# Patient Record
(demographics unavailable — no encounter records)

---

## 2024-10-11 NOTE — PHYSICAL EXAM
[TextEntry] : Focused Head and Neck Examination:  General Appearance: The patient has a normal appearance (head and face), able to communicate with normal speech and is a well-groomed, well-developed, well-nourished, thin female in no apparent distress.  Breathing was silent and not labored. The patient was alert had normal affect and oriented to person, place, and time. The patient had normal facial and neck skin with normal facial symmetry, strength and motion, no visible/ palpable facial masses or sinus tenderness.  Otologic Exam: The pinnae and bilateral external ear canals were without lesions and clear after removal cerumen AD.  The tympanic membranes were normal without perforation and demonstrated normal mobility. There was no evidence of middle ear effusion or infection. Hearing is grossly normal to finger rub.   External Nasal Exam: The external nose was normal in appearance without lesions or deformity.    Intranasal Exam: There were no mucosal lesions, discolorations, nasal polyps, or masses. The nasal septum was intact without perforations. The nasal septum was deviated slightly to the right. The inferior turbinates were normal. The middle turbinates were normal bilaterally.  The middle meatus was clear, and the secretions were normal.  Nasopharynx: There were no visible masses, mucosal ulcerations, or other lesions.  Secretions were normal.   Oral cavity: The patient's lips and vermillion border were normal without lesions or discolorations, dentition was poor, the gums were normal, the oral mucosa was normal and there were no lesions, discolorations, ulcerations, erythema, or leukoplakia.  The floor of mouth was without lesions or palpable calculi. The oral tongue was normal without palpable or visible lesions.  Expressed salivary flow was normal.  Stensens ducts are without palpable calculi.    Oropharynx: The tongue base was without palpable lesions, the soft palate was normal without lesions, the hard palate was normal without lesions or discolorations and the palatine tonsils had been removed, and the lingual tonsils were normal.  Pharynx: The lateral and posterior pharyngeal walls were intact without mucosal lesions, discolorations, ulcerations, or masses.    Larynx and Hypopharynx: Flexible fiber optic laryngoscopy was performed with topical anesthesia using 4% lidocaine and 0.5 % Oxymetazoline on cotton pledgets. More details of the exam are outlined in my procedure note but this examination revealed normal, symmetric vocal fold mobility and normal mucosa without vocal fold lesions, discolorations, or ulcerations.  There was slight posterior pachydermia.  Bilateral sessile edematous polyps present c/w smoker's laryngitis. The epiglottis and false vocal folds were normal without mucosal lesions. The piriform sinuses opened well and there were no lesions or pooling of saliva. The trachea was clear without narrowing in the immediate subglottic area and in midline position without lesions.    Neck Exam: There was no palpable lymphadenopathy or bruits in the central or lateral madi drainage basins levels I-VI. The parotid and submandibular glands were without palpable nodules or tenderness.  The temporomandibular joints were normal bilaterally without tenderness or crepitus.    Thyroid Examination: The thyroid gland was not enlarged, nontender and with a palpable nodule in the left lobe ~ 2 cm.  Neurological Exam: Cranial nerves II through XII were intact.  Gross motor and sensory functions are normal.  A Chvostek sign was not present.  Ocular Exam: Pupils were equal and responsive to light.  Cataracts removed. Extraocular movements and gaze were normal. The sclera and conjunctiva were normal and anicteric.  Nystagmus was absent.     Respiratory: Respiratory effort is normal with symmetric chest expansion and no retractions or use of accessory muscles.    Cardiovascular: Extremities are normal with strong pulses, normal temperature, and no edema.

## 2024-10-11 NOTE — CONSULT LETTER
[Dear  ___] : Dear  [unfilled], [Consult Letter:] : I had the pleasure of evaluating your patient, [unfilled]. [Please see my note below.] : Please see my note below. [Consult Closing:] : Thank you very much for allowing me to participate in the care of this patient.  If you have any questions, please do not hesitate to contact me. [Sincerely,] : Sincerely, [FreeTextEntry3] :  Thomas Verduzco M.D., FACS, [facility] Director [facility] Certified in Thyroid/Parathyroid/Neck Ultrasound, ANNA/ NOBLE , Department of Otolaryngology [facility] [employer] [facility] at [facility]

## 2024-10-11 NOTE — HISTORY OF PRESENT ILLNESS
[de-identified] : Gabriela is a 69-year-old female noted to have a left thyroid nodule on PE in 2020 and FNA biopsy reported as AUS.  She was monitored and f/u ultrasound in June 2023 showed an an essentially normal sized thyroid gland with multiple subcentimeter nodules in both lobes and a dominant mid to lower 2.1 cm stable nodule TR-4 identified.  This has been stable since December 2022.  However, since the prior biopsy was indeterminant, it was suggested that she undergo a repeat FNA with molecular diagnostics. The AFIRMA GSC was benign with no mutations identified.  Gabriela denies recent shortness of breath, voice changes, dysphagia, anterior neck pain, neck pressure or mass. There is no family history of thyroid cancer, but her son had a thyroidectomy. She denies any known radiation exposures in her youth. She is euthyroid.  She smokes tobacco up to 2PPD x 50 years (+). She denies fever, body aches, cough, cyanosis, chest burning, anosmia or recent known COVID exposures. She had a COVID infection in 2020.  All family members at home are well. She is vaccinated but not boosted. -------------------------------------------------------------------------------------------------------------------------------------------------------------------------------------  [FreeTextEntry1] :  Gabriela is a 70-year-old female noted to have a left thyroid nodule on routine exam in 2020 and FNA biopsy reported as AUS, Crawford III.  She was monitored and f/u ultrasound in June 2023 showed an an essentially normal size thyroid gland with multiple sub centimeter nodules in both lobes and a dominant mid to lower 2.1 cm stable nodule TR-4 identified. This has been stable since December 2022. However, since the prior biopsy was indeterminant, it was suggested that she undergo a repeat FNA with molecular diagnostics. The AFIRMA GSC was benign with no mutations identified.  She denies recent shortness of breath, voice changes, anterior neck pain, neck pressure or mass. She does have intermittent GERD and a globus sensation. She continues to smoke cigarettes despite encouragement to quit. Her son has schizophrenia and a stressful life.  She denies fever, body aches, cough, cyanosis, chest burning, anosmia or recent known COVID exposures.  All family members at home are well.  She is vaccinated but not boosted recently.

## 2024-10-11 NOTE — DATA REVIEWED
[de-identified] : see HPI  [de-identified] : see HPI  [de-identified] : cytology report reviewed as well as AFIRMA testing

## 2024-10-11 NOTE — PROCEDURE
[Image(s) Captured] : image(s) captured and filed [Unable to Cooperate with Mirror] : patient unable to cooperate with mirror [Gag Reflex] : gag reflex preventing mirror examination [Hoarseness] : hoarseness not clearly evaluated by indirect laryngoscopy [Topical Lidocaine] : topical lidocaine [Oxymetazoline HCl] : oxymetazoline HCl [Flexible Endoscope] : examined with the flexible endoscope [Serial Number: ___] : Serial Number: [unfilled] [Cerumen Impaction] : Cerumen Impaction [Same] : same as the Pre Op Dx. [] : Removal of Cerumen [FreeTextEntry3] :  [facility] THYROID/NECK ULTRASOUND REPORT  NAME: YISEL DEL CID .Philip...           MR# [id number].....	              : 1953	         DATE: 10/11/2024.  HISTORY/ INDICATIONS: A 70-year-old female with a history of a left thyroid lobe nodule benign on FNAB after molecular testing for follow-up.  COMPARISON: Outside ultrasound from   PROCEDURE: Physician performed high-resolution ultrasound gray scale imaging and color Doppler supplementation of the thyroid gland and neck was obtained in the longitudinal and transverse planes using a 13 MHz linear transducer with image capture.  All measurements are in centimeters (longitudinal x AP x transverse).    FINDINGS: Overall, the thyroid gland is normal in size, heterogeneous in echotexture with normal vascularity on color Doppler flow.   RIGHT LOBE: Is not enlarged, heterogeneous, with normal vascularity on color Doppler and measures 4.47 x 1.13 x 2.18 cm.  NODULES:  There are several tiny sub centimeter colloid nodules scattered throughout the right lobe none of which meet criteria for FNA biopsy or have suspicious calcifications or other features to suggest malignancy.  ISTHMUS: Measures 0.19 cm in AP dimension and is heterogeneous in echotexture with normal vascularity.  No nodules are identified.  LEFT LOBE: Is not enlarged, heterogeneous, with normal vascularity on color Doppler and measures 4.60 x 1.04 x 2.16 cm. NODULES: Within the left mid to lower lobe, a mildly hypoechoic, heterogeneous, mostly solid nodule, has ill-defined margins with grade 2 vascularity and no echogenic foci to suggest microcalcifications, that measures 2.18 x 1.35 x 1.72, TI-RADS TR 4 and stable previously measuring 2.3 x 1.4 x 1.3 cm.  PARATHYROID GLANDS: There are no identified enlarged parathyroid glands in the central neck compartment.   LYMPH NODES: Bilateral neck levels I - VI were examined.  There are several benign appearing sub centimeter lymph nodes identified at neck levels II- III bilaterally (lateral neck), all with echogenic hilar lines, no calcifications or cystic degeneration and have a short, long axis ratio < 0.5 in the transverse plane.  There are no enlarged or abnormal appearing central compartment, level VI lymph nodes.  IMPRESSION: A 70-year-old female with a heterogeneous nonenlarged thyroid gland with a stable left mid to lower lobe nodule previously biopsied and benign on molecular testing.  RECOMMENDATIONS: Repeat thyroid ultrasound in 2 years as well as continued monitoring of TSH.   Electronically signed by referring, interpreting and reporting physician Thomas Verduzco MD on 10/11/2024, 12:15 PM.  [facility] [address], [address] [place] [address] [phone] (voice), [phone] (fax). -------------------------------------------------------------------------------------------------------------------------------------------------------------------------------------    [de-identified] : Verbal informed consent was obtained for fiber optic laryngoscopy.  Findings: The nasal septum is minimally deviated to the left.  There are no masses or polyps, and the nasal mucosa and secretions are normal. The choanae and posterior nasopharynx are normal without masses or drainage. The Eustachian tube orifices appear patent. The pharynx, including the posterior and lateral pharyngeal walls, the vallecula and base of tongue are normal without ulcerations, lesions or masses. The hypopharynx including the pyriform sinuses open well without pooling of secretions, mucosal lesions or masses. The supraglottic larynx including the epiglottis, petiole, arytenoids, glossoepiglottic, aryepiglottic and pharyngoepiglottic folds are normal without mucosal lesions, ulcerations or masses. The glottis reveals normal false vocal folds. The true vocal folds are edematous, mildly hyperemic, tense and of equal length, without paralysis, having symmetric mobility on adduction and abduction. There are no mucosal lesions, nodules, cysts, erythroplasia or leukoplakia. Bilateral sessile smoker's polyps stable. The posterior cricoid area has healthy pink mucosa in the interarytenoid area and esophageal inlet. There is mildthickening/pachydermia of the interarytenoid mucosa suggestive of posterior laryngitis from laryngopharyngeal acid reflux disease. The trachea is clear without narrowing in the immediate subglottic region, without deviation or lesions. -------------------------------------------------------------------------------------------  [de-identified] : thyroid neoplasm, smoker, frequent throat clearing [FreeTextEntry1] : hearing loss due to cerumen impaction AD [FreeTextEntry6] : Copious cerumen removed from right ear canal with instrumentation.  TM is intact with normal mobility and improved hearing to finger rub.